# Patient Record
Sex: FEMALE | Race: WHITE | NOT HISPANIC OR LATINO | Employment: FULL TIME | URBAN - METROPOLITAN AREA
[De-identification: names, ages, dates, MRNs, and addresses within clinical notes are randomized per-mention and may not be internally consistent; named-entity substitution may affect disease eponyms.]

---

## 2018-07-09 ENCOUNTER — OFFICE VISIT (OUTPATIENT)
Dept: MEDICAL GROUP | Facility: MEDICAL CENTER | Age: 23
End: 2018-07-09
Payer: COMMERCIAL

## 2018-07-09 VITALS
TEMPERATURE: 99.2 F | RESPIRATION RATE: 16 BRPM | SYSTOLIC BLOOD PRESSURE: 110 MMHG | HEART RATE: 82 BPM | OXYGEN SATURATION: 97 % | DIASTOLIC BLOOD PRESSURE: 66 MMHG | BODY MASS INDEX: 28.05 KG/M2 | WEIGHT: 152.4 LBS | HEIGHT: 62 IN

## 2018-07-09 DIAGNOSIS — Z13.6 SCREENING FOR CARDIOVASCULAR CONDITION: ICD-10-CM

## 2018-07-09 DIAGNOSIS — Z30.41 ENCOUNTER FOR SURVEILLANCE OF CONTRACEPTIVE PILLS: ICD-10-CM

## 2018-07-09 DIAGNOSIS — R63.5 WEIGHT GAIN: ICD-10-CM

## 2018-07-09 DIAGNOSIS — F41.0 PANIC ATTACKS: ICD-10-CM

## 2018-07-09 DIAGNOSIS — Z00.00 WELLNESS EXAMINATION: ICD-10-CM

## 2018-07-09 DIAGNOSIS — F41.0 PANIC ATTACK: ICD-10-CM

## 2018-07-09 PROBLEM — Z83.49 FAMILY HISTORY OF THYROID DISORDER: Status: RESOLVED | Noted: 2018-07-09 | Resolved: 2018-07-09

## 2018-07-09 PROBLEM — F41.9 ANXIETY: Status: ACTIVE | Noted: 2018-07-09

## 2018-07-09 PROBLEM — Z83.49 FAMILY HISTORY OF THYROID DISORDER: Status: ACTIVE | Noted: 2018-07-09

## 2018-07-09 PROCEDURE — 99204 OFFICE O/P NEW MOD 45 MIN: CPT | Performed by: PHYSICIAN ASSISTANT

## 2018-07-09 RX ORDER — LEVONORGESTREL AND ETHINYL ESTRADIOL 0.1-0.02MG
KIT ORAL
COMMUNITY
Start: 2018-05-03 | End: 2018-08-03

## 2018-07-09 RX ORDER — ALPRAZOLAM 0.25 MG/1
TABLET ORAL
Qty: 20 TAB | Refills: 0 | Status: SHIPPED | OUTPATIENT
Start: 2018-07-09 | End: 2018-08-09

## 2018-07-09 ASSESSMENT — PATIENT HEALTH QUESTIONNAIRE - PHQ9: CLINICAL INTERPRETATION OF PHQ2 SCORE: 0

## 2018-07-09 NOTE — ASSESSMENT & PLAN NOTE
On BCPs. Not sure if they are contributing to panic attacks. Would like to consider IUD. Will schedule consult with Dr. Juarez. No hx of heavy bleeding or bad cramping. Definitely wants to be on something for birth control.

## 2018-07-09 NOTE — LETTER
[facility]  Carmella Duke P.A.-C.  [address]  [place] [address]  Fax: [phone]   Authorization for Release/Disclosure of   Protected Health Information   Name: BAUDILIO LANE : 1995 SSN: [id number]   Address: [address] [place] [address]  [place] [address] Phone:    [phone] (home)    I authorize the entity listed below to release/disclose the PHI below to:   [facility]/Carmella Duke P.A.-C. and Carmella Duke P.A.-C.   Provider or Entity Name:  [facility] KELLEE MERCER   Address   [place], [facility]   Phone:      Fax:     Reason for request: continuity of care   Information to be released:    [  ] LAST COLONOSCOPY,  including any PATH REPORT and follow-up  [  ] LAST FIT/COLOGUARD RESULT [  ] LAST DEXA  [  ] LAST MAMMOGRAM  [  ] LAST PAP  [  ] LAST LABS [  ] RETINA EXAM REPORT  [  ] IMMUNIZATION RECORDS  [  ] Release all info      [  ] Check here and initial the line next to each item to release ALL health information INCLUDING  _____ Care and treatment for drug and / or alcohol abuse  _____ HIV testing, infection status, or AIDS  _____ Genetic Testing    DATES OF SERVICE OR TIME PERIOD TO BE DISCLOSED: _____________  I understand and acknowledge that:  * This Authorization may be revoked at any time by you in writing, except if your health information has already been used or disclosed.  * Your health information that will be used or disclosed as a result of you signing this authorization could be re-disclosed by the recipient. If this occurs, your re-disclosed health information may no longer be protected by State or Federal laws.  * You may refuse to sign this Authorization. Your refusal will not affect your ability to obtain treatment.  * This Authorization becomes effective upon signing and will  on (date) __________.      If no date is indicated, this Authorization will  one (1) year from the signature date.    Name: Baudilio  Felicia    Signature:   Date:     7/9/2018       PLEASE FAX REQUESTED RECORDS BACK TO: [phone]

## 2018-07-09 NOTE — PROGRESS NOTES
Chief Complaint   Patient presents with   • Establish Care   • Anxiety   • Contraception     wondering if BC is related to birth control, started a year ago and anxiety has been worse since then   • Other     trouble losing weight, feet go numb sometimes       HISTORY OF THE PRESENT ILLNESS: This is a 23 y.o. female new patient to our practice. This pleasant patient is here today to establish care and discuss panic attacks, weight concerns, birth control pills    Panic attacks  Generally some mild anxiety, not bothersome for many years. Enjoys work, friends, in a healthy relationship, generally very happy. For 2 years getting more frequent panic attacks and getting more severe. 2-3 a week. Heart racing, stomach upset, diarrhea,  - full panic attacks. Getting harder to control. Previously hasn't been on any medication. Seeing psychologist through work. Very healthy diet. Exercises daily. Would like to discuss medication. No depression. No hx of drug, alcohol abuse.     Encounter for surveillance of contraceptive pills  On BCPs. Not sure if they are contributing to panic attacks. Would like to consider IUD. Will schedule consult with Dr. Juarez. No hx of heavy bleeding or bad cramping. Definitely wants to be on something for birth control.      History reviewed. No pertinent past medical history.no hx of heart or lung disease. No diabetes or immune disorder    History reviewed. No pertinent surgical history.    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 Very happy at her job. Sexually active with monogamous male partner. Non-smoker. No heavy alcohol use    Allergies: Cyclinex  "[tetracycline]    Current Outpatient Prescriptions Ordered in [facility]   Medication Sig Dispense Refill   • levonorgestrel-ethinyl estradiol (AVIANE, ALESSE, LESSINA) 0.1-20 MG-MCG per tablet      • ALPRAZolam (XANAX) 0.25 MG Tab Take 0.25mg at onset of panic attack, can repeat in 30 minutes if needed. Not for daily use 20 Tab 0     No current Epic-ordered facility-administered medications on file.        Review of Systems   Constitutional: Negative for fever, chills, weight loss - does chronically feel tired, thinks that is normal for her  HENT: Negative for ear pain, nosebleeds, congestion, sore throat and neck pain.    Eyes: Negative for blurred vision.   Respiratory: Negative for cough, sputum production, shortness of breath and wheezing.    Cardiovascular: Negative for chest pain, palpitations, orthopnea and leg swelling.   Gastrointestinal: Negative for heartburn, nausea, vomiting and abdominal pain.   Genitourinary: Negative for dysuria, urgency and frequency.   Musculoskeletal: Negative for myalgias, back pain and joint pain.   Skin: Negative for rash and itching.   Neurological: Negative for dizziness, tingling, tremors, sensory change, focal weakness and headaches.   Endo/Heme/Allergies: Does not bruise/bleed easily.      All other systems reviewed and are negative except as in HPI.    Exam: Blood pressure 110/66, pulse 82, temperature 37.3 °C (99.2 °F), resp. rate 16, height 1.575 m (5' 2\"), weight 69.1 kg (152 lb 6.4 oz), last menstrual period 07/04/2018, SpO2 97 %.  General: Normal appearing. No distress.  HEENT: Normocephalic. Eyes conjunctiva clear lids without ptosis, pupils equal and reactive to light accommodation, ears normal shape and contour, canals are clear bilaterally, tympanic membranes are benign, nasal mucosa benign, oropharynx is without erythema, edema or exudates.   Neck: Supple without JVD or bruit. Thyroid is not enlarged.  Pulmonary: Clear to ausculation.  Normal effort. No rales, " ronchi, or wheezing.  Cardiovascular: Regular rate and rhythm without murmur. Carotid and radial pulses are intact and equal bilaterally.  Neurologic: Grossly nonfocal  Lymph: No cervical, supraclavicular lymph nodes are palpable  Skin: Warm and dry.  No obvious lesions.  Musculoskeletal: Normal gait. No extremity cyanosis, clubbing, or edema.  Psych: Normal mood and affect. Alert and oriented x3. Judgment and insight is normal.    Assessment/Plan  1. Panic attacks     2. Weight gain     3. Wellness examination  CBC WITH DIFFERENTIAL    COMP METABOLIC PANEL    TSH   4. Screening for cardiovascular condition  LIPID PROFILE   5. Encounter for surveillance of contraceptive pills     6. Panic attack  ALPRAZolam (XANAX) 0.25 MG Tab     Check labs. Cont with psychologist. Discussed risk/benefit and use of xanax as well as side effects, possible addiction/dependency.  verified. Patient will not take with alcohol. Will not drive while on medication. Will f/u for lab review and to discuss symptoms. Will call with any concerns.

## 2018-07-09 NOTE — ASSESSMENT & PLAN NOTE
Generally some mild anxiety, not bothersome for many years. Enjoys work, friends, in a healthy relationship, generally very happy. For 2 years getting more frequent panic attacks and getting more severe. 2-3 a week. Heart racing, stomach upset, diarrhea,  - full panic attacks. Getting harder to control. Previously hasn't been on any medication. Seeing psychologist through work. Very healthy diet. Exercises daily. Would like to discuss medication. No depression. No hx of drug, alcohol abuse.

## 2018-07-09 NOTE — PATIENT INSTRUCTIONS
Panic Attacks  Panic attacks are sudden, short-lived surges of severe anxiety, fear, or discomfort. They may occur for no reason when you are relaxed, when you are anxious, or when you are sleeping. Panic attacks may occur for a number of reasons:  · Healthy people occasionally have panic attacks in extreme, life-threatening situations, such as war or natural disasters. Normal anxiety is a protective mechanism of the body that helps us react to danger (fight or flight response).  · Panic attacks are often seen with anxiety disorders, such as panic disorder, social anxiety disorder, generalized anxiety disorder, and phobias. Anxiety disorders cause excessive or uncontrollable anxiety. They may interfere with your relationships or other life activities.  · Panic attacks are sometimes seen with other mental illnesses, such as depression and posttraumatic stress disorder.  · Certain medical conditions, prescription medicines, and drugs of abuse can cause panic attacks.  What are the signs or symptoms?  Panic attacks start suddenly, peak within 20 minutes, and are accompanied by four or more of the following symptoms:  · Pounding heart or fast heart rate (palpitations).  · Sweating.  · Trembling or shaking.  · Shortness of breath or feeling smothered.  · Feeling choked.  · Chest pain or discomfort.  · Nausea or strange feeling in your stomach.  · Dizziness, light-headedness, or feeling like you will faint.  · Chills or hot flushes.  · Numbness or tingling in your lips or hands and feet.  · Feeling that things are not real or feeling that you are not yourself.  · Fear of losing control or going crazy.  · Fear of dying.  Some of these symptoms can mimic serious medical conditions. For example, you may think you are having a heart attack. Although panic attacks can be very scary, they are not life threatening.  How is this diagnosed?  Panic attacks are diagnosed through an assessment by your health care provider. Your  health care provider will ask questions about your symptoms, such as where and when they occurred. Your health care provider will also ask about your medical history and use of alcohol and drugs, including prescription medicines. Your health care provider may order blood tests or other studies to rule out a serious medical condition. Your health care provider may refer you to a mental health professional for further evaluation.  How is this treated?  · Most healthy people who have one or two panic attacks in an extreme, life-threatening situation will not require treatment.  · The treatment for panic attacks associated with anxiety disorders or other mental illness typically involves counseling with a mental health professional, medicine, or a combination of both. Your health care provider will help determine what treatment is best for you.  · Panic attacks due to physical illness usually go away with treatment of the illness. If prescription medicine is causing panic attacks, talk with your health care provider about stopping the medicine, decreasing the dose, or substituting another medicine.  · Panic attacks due to alcohol or drug abuse go away with abstinence. Some adults need professional help in order to stop drinking or using drugs.  Follow these instructions at home:  · Take all medicines as directed by your health care provider.  · Schedule and attend follow-up visits as directed by your health care provider. It is important to keep all your appointments.  Contact a health care provider if:  · You are not able to take your medicines as prescribed.  · Your symptoms do not improve or get worse.  Get help right away if:  · You experience panic attack symptoms that are different than your usual symptoms.  · You have serious thoughts about hurting yourself or others.  · You are taking medicine for panic attacks and have a serious side effect.  This information is not intended to replace advice given to you by  your health care provider. Make sure you discuss any questions you have with your health care provider.  Document Released: 12/18/2006 Document Revised: 05/25/2017 Document Reviewed: 08/01/2014  Elsevier Interactive Patient Education © 2017 [employer] Inc[employer]

## 2018-07-09 NOTE — ASSESSMENT & PLAN NOTE
Up to 160 a year ago. Has been able to lose 10 pounds with strict diet and exercise. 5386-5041 calories per day. 60 minutes cardio. 30 minutes strength training 5 days a week. Can't lose any more. May be genetic. Mom is heavier as well. Patient wants to make sure it isn't a thyroid issue.

## 2018-07-21 ENCOUNTER — HOSPITAL ENCOUNTER (OUTPATIENT)
Dept: LAB | Facility: MEDICAL CENTER | Age: 23
End: 2018-07-21
Attending: PHYSICIAN ASSISTANT
Payer: COMMERCIAL

## 2018-07-21 DIAGNOSIS — Z13.6 SCREENING FOR CARDIOVASCULAR CONDITION: ICD-10-CM

## 2018-07-21 DIAGNOSIS — Z00.00 WELLNESS EXAMINATION: ICD-10-CM

## 2018-07-21 LAB
ALBUMIN SERPL BCP-MCNC: 4.8 G/DL (ref 3.2–4.9)
ALBUMIN/GLOB SERPL: 1.8 G/DL
ALP SERPL-CCNC: 46 U/L (ref 30–99)
ALT SERPL-CCNC: 35 U/L (ref 2–50)
ANION GAP SERPL CALC-SCNC: 8 MMOL/L (ref 0–11.9)
AST SERPL-CCNC: 19 U/L (ref 12–45)
BASOPHILS # BLD AUTO: 1.1 % (ref 0–1.8)
BASOPHILS # BLD: 0.06 K/UL (ref 0–0.12)
BILIRUB SERPL-MCNC: 0.4 MG/DL (ref 0.1–1.5)
BUN SERPL-MCNC: 18 MG/DL (ref 8–22)
CALCIUM SERPL-MCNC: 10 MG/DL (ref 8.5–10.5)
CHLORIDE SERPL-SCNC: 107 MMOL/L (ref 96–112)
CHOLEST SERPL-MCNC: 140 MG/DL (ref 100–199)
CO2 SERPL-SCNC: 26 MMOL/L (ref 20–33)
CREAT SERPL-MCNC: 0.75 MG/DL (ref 0.5–1.4)
EOSINOPHIL # BLD AUTO: 0.05 K/UL (ref 0–0.51)
EOSINOPHIL NFR BLD: 0.9 % (ref 0–6.9)
ERYTHROCYTE [DISTWIDTH] IN BLOOD BY AUTOMATED COUNT: 40.9 FL (ref 35.9–50)
GLOBULIN SER CALC-MCNC: 2.7 G/DL (ref 1.9–3.5)
GLUCOSE SERPL-MCNC: 88 MG/DL (ref 65–99)
HCT VFR BLD AUTO: 42.2 % (ref 37–47)
HDLC SERPL-MCNC: 55 MG/DL
HGB BLD-MCNC: 13.3 G/DL (ref 12–16)
IMM GRANULOCYTES # BLD AUTO: 0.01 K/UL (ref 0–0.11)
IMM GRANULOCYTES NFR BLD AUTO: 0.2 % (ref 0–0.9)
LDLC SERPL CALC-MCNC: 76 MG/DL
LYMPHOCYTES # BLD AUTO: 1.64 K/UL (ref 1–4.8)
LYMPHOCYTES NFR BLD: 30.4 % (ref 22–41)
MCH RBC QN AUTO: 27 PG (ref 27–33)
MCHC RBC AUTO-ENTMCNC: 31.5 G/DL (ref 33.6–35)
MCV RBC AUTO: 85.8 FL (ref 81.4–97.8)
MONOCYTES # BLD AUTO: 0.34 K/UL (ref 0–0.85)
MONOCYTES NFR BLD AUTO: 6.3 % (ref 0–13.4)
NEUTROPHILS # BLD AUTO: 3.3 K/UL (ref 2–7.15)
NEUTROPHILS NFR BLD: 61.1 % (ref 44–72)
NRBC # BLD AUTO: 0 K/UL
NRBC BLD-RTO: 0 /100 WBC
PLATELET # BLD AUTO: 286 K/UL (ref 164–446)
PMV BLD AUTO: 11.7 FL (ref 9–12.9)
POTASSIUM SERPL-SCNC: 4.1 MMOL/L (ref 3.6–5.5)
PROT SERPL-MCNC: 7.5 G/DL (ref 6–8.2)
RBC # BLD AUTO: 4.92 M/UL (ref 4.2–5.4)
SODIUM SERPL-SCNC: 141 MMOL/L (ref 135–145)
TRIGL SERPL-MCNC: 44 MG/DL (ref 0–149)
TSH SERPL DL<=0.005 MIU/L-ACNC: 1.18 UIU/ML (ref 0.38–5.33)
WBC # BLD AUTO: 5.4 K/UL (ref 4.8–10.8)

## 2018-07-21 PROCEDURE — 80061 LIPID PANEL: CPT

## 2018-07-21 PROCEDURE — 36415 COLL VENOUS BLD VENIPUNCTURE: CPT

## 2018-07-21 PROCEDURE — 84443 ASSAY THYROID STIM HORMONE: CPT

## 2018-07-21 PROCEDURE — 80053 COMPREHEN METABOLIC PANEL: CPT

## 2018-07-21 PROCEDURE — 85025 COMPLETE CBC W/AUTO DIFF WBC: CPT

## 2018-07-23 ENCOUNTER — OFFICE VISIT (OUTPATIENT)
Dept: MEDICAL GROUP | Facility: MEDICAL CENTER | Age: 23
End: 2018-07-23
Payer: COMMERCIAL

## 2018-07-23 VITALS
SYSTOLIC BLOOD PRESSURE: 100 MMHG | WEIGHT: 147.6 LBS | BODY MASS INDEX: 27.16 KG/M2 | RESPIRATION RATE: 16 BRPM | OXYGEN SATURATION: 98 % | HEIGHT: 62 IN | DIASTOLIC BLOOD PRESSURE: 60 MMHG | HEART RATE: 84 BPM

## 2018-07-23 DIAGNOSIS — F41.0 PANIC ATTACKS: ICD-10-CM

## 2018-07-23 PROCEDURE — 99213 OFFICE O/P EST LOW 20 MIN: CPT | Performed by: PHYSICIAN ASSISTANT

## 2018-07-23 NOTE — ASSESSMENT & PLAN NOTE
Patient states she is doing well. Going through some changes at work, may be getting promotion, very busy. Just broke up with boyfriend, together 6 years, feels good about it generally but still stressful. Only took the xanax once and felt it worked well. Feels better just knowing she has something to take if she needs it. Not interested in further evaluation and/or treatment at this time but will f/u as needed.

## 2018-07-23 NOTE — PROGRESS NOTES
"Subjective:   Charlotte Duarte is a 23 y.o. female here today for panic attack f/u    Panic attacks  Patient states she is doing well. Going through some changes at work, may be getting promotion, very busy. Just broke up with boyfriend, together 6 years, feels good about it generally but still stressful. Only took the xanax once and felt it worked well. Feels better just knowing she has something to take if she needs it. Not interested in further evaluation and/or treatment at this time but will f/u as needed.       Current medicines (including changes today)  Current Outpatient Prescriptions   Medication Sig Dispense Refill   • levonorgestrel-ethinyl estradiol (AVIANE, ALESSE, LESSINA) 0.1-20 MG-MCG per tablet      • ALPRAZolam (XANAX) 0.25 MG Tab Take 0.25mg at onset of panic attack, can repeat in 30 minutes if needed. Not for daily use 20 Tab 0     No current facility-administered medications for this visit.      She  has no past medical history on file.    ROS   No fever/chills. No headache/dizziness. No focal weakness. No sore throat, nasal congestion, ear pain. No chest pain, no shortness of breath, difficulty breathing. No n/v/d/c or abdominal pain. No urinary complaint. No rash or skin lesion. No joint pain or swelling.       Objective:     Blood pressure 100/60, pulse 84, resp. rate 16, height 1.575 m (5' 2\"), weight 67 kg (147 lb 9.6 oz), last menstrual period 07/04/2018, SpO2 98 %. Body mass index is 27 kg/m².   Physical Exam:  Constitutional: WDWN, NAD  Skin: Warm, dry, good turgor, no rashes in visible areas.  Psych: Alert and oriented x3, normal affect and mood.        Assessment and Plan:   The following treatment plan was discussed    1. Panic attacks  Xanax prn. Cont healthy diet and exercise. f/u as needed    Recent CBC, CMP, TSH, Lipid panel reviewed with patient in office, all wnl      Followup: Return if symptoms worsen or fail to improve.         "

## 2018-08-03 ENCOUNTER — OFFICE VISIT (OUTPATIENT)
Dept: MEDICAL GROUP | Facility: MEDICAL CENTER | Age: 23
End: 2018-08-03
Payer: COMMERCIAL

## 2018-08-03 VITALS
WEIGHT: 147 LBS | SYSTOLIC BLOOD PRESSURE: 112 MMHG | RESPIRATION RATE: 16 BRPM | HEIGHT: 62 IN | HEART RATE: 73 BPM | DIASTOLIC BLOOD PRESSURE: 60 MMHG | OXYGEN SATURATION: 96 % | BODY MASS INDEX: 27.05 KG/M2 | TEMPERATURE: 98.5 F

## 2018-08-03 DIAGNOSIS — L70.9 ACNE, UNSPECIFIED ACNE TYPE: ICD-10-CM

## 2018-08-03 DIAGNOSIS — Z30.41 ENCOUNTER FOR SURVEILLANCE OF CONTRACEPTIVE PILLS: ICD-10-CM

## 2018-08-03 DIAGNOSIS — Z30.019 ENCOUNTER FOR INITIAL PRESCRIPTION OF CONTRACEPTIVES, UNSPECIFIED CONTRACEPTIVE: ICD-10-CM

## 2018-08-03 DIAGNOSIS — F41.0 PANIC ATTACKS: ICD-10-CM

## 2018-08-03 PROCEDURE — 99202 OFFICE O/P NEW SF 15 MIN: CPT | Performed by: FAMILY MEDICINE

## 2018-08-03 RX ORDER — SPIRONOLACTONE 25 MG/1
25 TABLET ORAL 2 TIMES DAILY
Qty: 180 TAB | Refills: 3 | Status: SHIPPED | OUTPATIENT
Start: 2018-08-03 | End: 2018-11-01

## 2018-08-04 NOTE — PROGRESS NOTES
"This medical record contains text that has been entered with the assistance of computer voice recognition and dictation software.  Therefore, it may contain unintended errors in text, spelling, punctuation, or grammar        Chief Complaint   Patient presents with   • Contraception     IUD       Charlotte Duarte is a 23 y.o. female here evaluation and management of: discuss IUD contraception       HPI:     Pt was in [employer]   She works for [employer]   She has had bad experience with OCP wt gain mood swings  Went off pills  Worsening acne  Desires [facility]         Current Outpatient Prescriptions   Medication Sig Dispense Refill   • levonorgestrel (MIRENA) 20 MCG/24HR IUD 1 Intra Uterine Device by Intrauterine route Once for 1 dose. 1 Intra Uterine Device 0   • spironolactone (ALDACTONE) 25 MG Tab Take 1 Tab by mouth 2 times a day for 90 days. 180 Tab 3   • ALPRAZolam (XANAX) 0.25 MG Tab Take 0.25mg at onset of panic attack, can repeat in 30 minutes if needed. Not for daily use 20 Tab 0     No current facility-administered medications for this visit.      Patient Active Problem List    Diagnosis Date Noted   • Encounter for initial prescription of contraceptives 08/03/2018   • Acne 08/03/2018   • Panic attacks 07/09/2018   • Weight gain 07/09/2018   • Encounter for surveillance of contraceptive pills 07/09/2018     No past surgical history on file.   Social History   Substance Use Topics   • Smoking status: Never Smoker   • Smokeless tobacco: Never Used   • Alcohol use Yes     Family History   Problem Relation Age of Onset   • No Known Problems Mother    • Hypertension Father    • Arthritis Sister            ROS  No n/v  all review of system completed and negative except for those listed above     Objective:     Blood pressure 112/60, pulse 73, temperature 36.9 °C (98.5 °F), resp. rate 16, height 1.575 m (5' 2\"), weight 66.7 kg (147 lb), last menstrual period 07/28/2018, SpO2 96 %. Body mass index is 26.89 " kg/m².  Physical Exam:        GEN: comfortable, alert and oriented, well nourished, well developed, in no apparent distress   HEENT: NCAT, eyes: pupils equal and reactive, sclera white, EOMIT, good dentition  HEART: limbs warm and well perfused, regular rate, no JVD, no lower extremity edema  LUNGS: speaking in full sentences, not in apparent respiratory distress, no audible wheezes  MSK: normal tone and bulk, no swelling of the joints, gait steady and normal           Assessment and Plan:   The following treatment plan was discussed        Problem List Items Addressed This Visit     Panic attacks     Would like to avoid or minimize hormone exogenous   Feels that makes anxiety worse             Encounter for surveillance of contraceptive pills     Stopped already            Encounter for initial prescription of contraceptives     After discussion we decide to go with IUD             Relevant Medications    levonorgestrel (MIRENA) 20 MCG/24HR IUD    Acne     Recently stopped ocp  spironolactone for hormonal acne               Relevant Medications    spironolactone (ALDACTONE) 25 MG Tab                Instructed to follow up if symptoms worsen or fail to improve, ER/UC precautions discussed as well    Trupti Juarez MD  [facility], Family Medicine   [address] [place], [place] [address]y   [place] [address]  Phone: [phone]

## 2018-08-29 ENCOUNTER — OFFICE VISIT (OUTPATIENT)
Dept: MEDICAL GROUP | Facility: MEDICAL CENTER | Age: 23
End: 2018-08-29
Payer: COMMERCIAL

## 2018-08-29 VITALS
DIASTOLIC BLOOD PRESSURE: 60 MMHG | RESPIRATION RATE: 18 BRPM | SYSTOLIC BLOOD PRESSURE: 112 MMHG | TEMPERATURE: 98.5 F | WEIGHT: 147 LBS | OXYGEN SATURATION: 95 % | HEIGHT: 62 IN | BODY MASS INDEX: 27.05 KG/M2 | HEART RATE: 79 BPM

## 2018-08-29 DIAGNOSIS — Z30.430 ENCOUNTER FOR IUD INSERTION: ICD-10-CM

## 2018-08-29 LAB
INT CON NEG: NEGATIVE
INT CON POS: POSITIVE
POC URINE PREGNANCY TEST: NEGATIVE

## 2018-08-29 PROCEDURE — 81025 URINE PREGNANCY TEST: CPT | Performed by: FAMILY MEDICINE

## 2018-08-29 PROCEDURE — 58300 INSERT INTRAUTERINE DEVICE: CPT | Performed by: FAMILY MEDICINE

## 2018-08-29 RX ORDER — ALPRAZOLAM 0.25 MG/1
0.25 TABLET ORAL NIGHTLY PRN
COMMUNITY
End: 2019-06-26 | Stop reason: SDUPTHER

## 2018-08-29 NOTE — PROGRESS NOTES
IUD Insertion Note:  The patient presents for mirena IUD insertion.  The pap smear history has been reviewed.  She has not had any recent unprotected sex. She has no contraindications to IUD and we discussed the risks, benefits, expected side effects and alternatives to the IUD.  She understands that the IUD does not protect against STI's.  All questions answered and written consent obtained     Procedure Note:  Bi-mannual pelvic examination: Normal external female genitalia.  No lesions. Uterus size shape and consistency wnl.  No adnexal masses.  No CMT.  Uterus position: retrovert  A speculum was placed into vagina and cervix  Normal appearing cervix, normal vaginal gricelda, no purulent discharge  The cervix was cleaned with betadine.   The physician used sterile procedure gloves.     A sterile tenaculum was placed. The uterus was measured using a sterile sound and was measured to be 7cm.  The sound was then withdrawn. The IUD was  loaded in a sterile manner and advanced into position. The string was visualized and cut to approximately 3.5cm.    Patient tolerated the procedure well. No complications.    She was given a detailed aftercare instructions and return/ER precautions as well as detailed instructions about her IUD.      1 mo follow up for IUD string check with MD benton

## 2019-04-25 ENCOUNTER — PATIENT MESSAGE (OUTPATIENT)
Dept: MEDICAL GROUP | Facility: MEDICAL CENTER | Age: 24
End: 2019-04-25

## 2019-05-02 ENCOUNTER — OFFICE VISIT (OUTPATIENT)
Dept: MEDICAL GROUP | Facility: MEDICAL CENTER | Age: 24
End: 2019-05-02
Payer: COMMERCIAL

## 2019-05-02 VITALS
WEIGHT: 149.03 LBS | RESPIRATION RATE: 16 BRPM | BODY MASS INDEX: 27.43 KG/M2 | HEIGHT: 62 IN | DIASTOLIC BLOOD PRESSURE: 60 MMHG | HEART RATE: 80 BPM | SYSTOLIC BLOOD PRESSURE: 112 MMHG | TEMPERATURE: 99 F | OXYGEN SATURATION: 99 %

## 2019-05-02 DIAGNOSIS — Z30.431 IUD CHECK UP: ICD-10-CM

## 2019-05-02 PROCEDURE — 99212 OFFICE O/P EST SF 10 MIN: CPT | Performed by: FAMILY MEDICINE

## 2019-05-02 ASSESSMENT — PAIN SCALES - GENERAL: PAINLEVEL: NO PAIN

## 2019-05-02 ASSESSMENT — PATIENT HEALTH QUESTIONNAIRE - PHQ9: CLINICAL INTERPRETATION OF PHQ2 SCORE: 0

## 2019-05-02 NOTE — ASSESSMENT & PLAN NOTE
IUD strings visualized with closed os indicating that IUD is in place  Strings trimmed     All questions answered    Over 25 minutes spent with patient face to face, greater than 50% time spent with plan/coordination of care regarding that which is discussed in the HPI and A&P

## 2019-05-02 NOTE — PROGRESS NOTES
"This medical record contains text that has been entered with the assistance of computer voice recognition and dictation software.  Therefore, it may contain unintended errors in text, spelling, punctuation, or grammar        Chief Complaint   Patient presents with   • Contraception     misplaced IUD after sexual intercourse        Charlotte Duarte is a 24 y.o. female here evaluation and management of: discuss IUD contraception       HPI:     Pt was in [employer]   She works for [employer]   We placed IUD this summer  Having some occasional \"poking\"  Amenorrhea   No pelvic pain bloating     Current Outpatient Prescriptions   Medication Sig Dispense Refill   • ALPRAZolam (XANAX) 0.25 MG Tab Take 0.25 mg by mouth at bedtime as needed for Sleep.       No current facility-administered medications for this visit.      Patient Active Problem List    Diagnosis Date Noted   • IUD check up 05/02/2019   • Encounter for IUD insertion 08/29/2018   • Encounter for initial prescription of contraceptives 08/03/2018   • Acne 08/03/2018   • Panic attacks 07/09/2018   • Weight gain 07/09/2018   • Encounter for surveillance of contraceptive pills 07/09/2018     No past surgical history on file.   Social History   Substance Use Topics   • Smoking status: Never Smoker   • Smokeless tobacco: Never Used   • Alcohol use Yes     Family History   Problem Relation Age of Onset   • No Known Problems Mother    • Hypertension Father    • Arthritis Sister            ROS  No n/v  all review of system completed and negative except for those listed above     Objective:     /60 (BP Location: Right arm, Patient Position: Sitting, BP Cuff Size: Adult)   Pulse 80   Temp 37.2 °C (99 °F) (Temporal)   Resp 16   Ht 1.575 m (5' 2\")   Wt 67.6 kg (149 lb 0.5 oz)   SpO2 99%  Body mass index is 27.26 kg/m².  Physical Exam:        GEN: comfortable, alert and oriented, well nourished, well developed, in no apparent distress   HEENT: NCAT, eyes: pupils " equal and reactive, sclera white, EOMIT, good dentition  HEART: limbs warm and well perfused, regular rate, no JVD, no lower extremity edema  LUNGS: speaking in full sentences, not in apparent respiratory distress, no audible wheezes  MSK: normal tone and bulk, no swelling of the joints, gait steady and normal      -   Normal external female genitalia   PELVIC:  Normal external female genitalia.  Speculum: Vaginal gricelda wnl no purulent discharge.  Cervix non friable.  Bimanual examination : Uterus size shape and consistency wnl.  No adnexal masses.  No Cervical motion tenderness   IUD strings visualized             Assessment and Plan:   The following treatment plan was discussed        Problem List Items Addressed This Visit     IUD check up     IUD strings visualized with closed os indicating that IUD is in place  Strings trimmed     All questions answered                           Instructed to follow up if symptoms worsen or fail to improve, ER/UC precautions discussed as well    Trupti Juarez MD  [facility], Family Medicine   [address] [place] [address] Pky   [place] [address]  Phone: [phone]

## 2019-06-26 ENCOUNTER — OFFICE VISIT (OUTPATIENT)
Dept: MEDICAL GROUP | Facility: MEDICAL CENTER | Age: 24
End: 2019-06-26
Payer: COMMERCIAL

## 2019-06-26 VITALS
SYSTOLIC BLOOD PRESSURE: 102 MMHG | HEIGHT: 62 IN | BODY MASS INDEX: 27.05 KG/M2 | OXYGEN SATURATION: 98 % | RESPIRATION RATE: 16 BRPM | WEIGHT: 147 LBS | TEMPERATURE: 98.2 F | HEART RATE: 84 BPM | DIASTOLIC BLOOD PRESSURE: 64 MMHG

## 2019-06-26 DIAGNOSIS — F41.0 PANIC ATTACKS: ICD-10-CM

## 2019-06-26 DIAGNOSIS — F41.9 ANXIETY: ICD-10-CM

## 2019-06-26 PROCEDURE — 99213 OFFICE O/P EST LOW 20 MIN: CPT | Performed by: PHYSICIAN ASSISTANT

## 2019-06-26 RX ORDER — ALPRAZOLAM 0.25 MG/1
0.25 TABLET ORAL NIGHTLY PRN
Qty: 30 TAB | Refills: 0 | Status: SHIPPED
Start: 2019-06-26 | End: 2019-07-26

## 2019-06-26 RX ORDER — BUSPIRONE HYDROCHLORIDE 7.5 MG/1
7.5 TABLET ORAL 3 TIMES DAILY PRN
Qty: 90 TAB | Refills: 1 | Status: SHIPPED | OUTPATIENT
Start: 2019-06-26 | End: 2019-07-26

## 2019-06-28 NOTE — ASSESSMENT & PLAN NOTE
Symptoms have worsened recently. Unclear of cause but feeling more and more daily anxiety. It is difficult for her to go out with friends or go on trips because it makes her anxious. She denies feelings of depression or history of depression. She is seeing a therapist at her job but would like to think about daily medication. Anxious feelings include nervousness, nausea and/or stomach upset, palpitations, sometimes difficulty getting a deep breath. She is going on a trip with friends soon and doesn't want the anxiety and the panic attacks to get in the way of her time with friends.

## 2019-06-28 NOTE — PROGRESS NOTES
"Subjective:   Charlotte Duarte is a 24 y.o. female here today for anxiety and panic attacks    Panic attacks  Symptoms have worsened recently. Unclear of cause but feeling more and more daily anxiety. It is difficult for her to go out with friends or go on trips because it makes her anxious. She denies feelings of depression or history of depression. She is seeing a therapist at her job but would like to think about daily medication. Anxious feelings include nervousness, nausea and/or stomach upset, palpitations, sometimes difficulty getting a deep breath. She is going on a trip with friends soon and doesn't want the anxiety and the panic attacks to get in the way of her time with friends.        Current medicines (including changes today)  Current Outpatient Prescriptions   Medication Sig Dispense Refill   • ALPRAZolam (XANAX) 0.25 MG Tab Take 1 Tab by mouth at bedtime as needed for Sleep for up to 30 days. 30 Tab 0   • busPIRone (BUSPAR) 7.5 MG tablet Take 1 Tab by mouth 3 times a day as needed for up to 30 days. 90 Tab 1     No current facility-administered medications for this visit.      She  has no past medical history on file.    ROS   No fever/chills. No weight change. No headache/dizziness. No focal weakness. No sore throat, nasal congestion, ear pain. No chest pain, no shortness of breath, difficulty breathing. No n/v/d/c or abdominal pain. No urinary complaint. No rash or skin lesion. No joint pain or swelling.       Objective:     /64 (BP Location: Right arm, Patient Position: Sitting)   Pulse 84   Temp 36.8 °C (98.2 °F) (Temporal)   Resp 16   Ht 1.575 m (5' 2\")   Wt 66.7 kg (147 lb)   SpO2 98%  Body mass index is 26.89 kg/m².   Physical Exam:  Constitutional: WDWN, NAD  Skin: Warm, dry, good turgor, no rashes in visible areas.  Psych: Alert and oriented x3, normal affect and mood.    Assessment and Plan:   The following treatment plan was discussed    1. Anxiety  Discussed risk/benefit and " potential side effects of medication. Start with one daily then can increase to up to 3 times daily if needed and if tolerated.   - busPIRone (BUSPAR) 7.5 MG tablet; Take 1 Tab by mouth 3 times a day as needed for up to 30 days.  Dispense: 90 Tab; Refill: 1    2. Panic attacks  [facility] Aware web site evaluation: I have obtained and reviewed patient utilization report from [facility] pharmacy database prior to writing prescription for controlled substance II, III or IV. Based on the report and my clinical assessment the prescription is medically necessary.   Patient is cautioned on sedation potential of benzodiazepine medication; no drinking, driving or operating heavy machinery while on this medication.  - ALPRAZolam (XANAX) 0.25 MG Tab; Take 1 Tab by mouth at bedtime as needed for Sleep for up to 30 days.  Dispense: 30 Tab; Refill: 0    Controlled Substance Assessment:     - Is the medication, if previously prescribed, working as intended and expected to treat   the patients symptoms: yes    - Does the patient have a history of substance abuse: no    - has the patient demonstrated aberrant behavior or intoxication: no.     - Is there reason to believe that the patient is not using medication as prescribed or diverting the medication: no    - Is there reason to believe that the patient is currently misusing this medication or addicted to the controlled substance: no    - Is it necessary to verify that controlled substances, other that those authorized under the treatment plan, are not present in the body of this patient: no    - Are there any blood or urine test that indicate inappropriate use of the medication or other controlled substances : no    - I have reviewed the . Does the  report irregular/inconsistent medication use or indicate that the medication is being used inappropriately or that the patient is using another controlled substance not prescribed or known to me: no    - Is there reason to  believe that the patient is using other drugs, including alcohol, prescription or illicit drugs, that may interact negatively with controlled substance or have not been prescribed by a practitioner who is treating the patient: no    - Are there any known early refill attempts or claims that medication has been lost or stolen: no    - Are there are any major changes in the patient's mental or physical health that would affect the medical appropriateness of this medication: no    - Has the patient increased the dose of medication without provider authorization:no    - Has the patient been reluctant to cooperate with any exam, analysis or test recommended by me: no    - Has the patient demonstrated reluctance to stop or reduce use of the medicine: no    - Has the patient requested or demanded a controlled substance that is likely to be abused or cause dependency or addiction: no    - Is there any other evidence that the patient is chronically using opioids, misusing, abusing, illegally using or addicted to any drug or failing to comply with the instructions of the practitioner concerning the use of the controlled substance:no    - Are there any other factors that the practitioner determines is necessary to make an informed professional judgment concerning the medical appropriateness of the prescription: no  Followup: one month

## 2019-07-12 ENCOUNTER — PATIENT MESSAGE (OUTPATIENT)
Dept: MEDICAL GROUP | Facility: MEDICAL CENTER | Age: 24
End: 2019-07-12

## 2019-07-12 DIAGNOSIS — F41.9 ANXIETY: ICD-10-CM

## 2019-07-12 RX ORDER — BUSPIRONE HYDROCHLORIDE 15 MG/1
15 TABLET ORAL 3 TIMES DAILY
Qty: 90 TAB | Refills: 2 | Status: SHIPPED | OUTPATIENT
Start: 2019-07-12 | End: 2019-08-06 | Stop reason: SDUPTHER

## 2019-07-26 ENCOUNTER — OFFICE VISIT (OUTPATIENT)
Dept: MEDICAL GROUP | Facility: MEDICAL CENTER | Age: 24
End: 2019-07-26
Payer: COMMERCIAL

## 2019-07-26 VITALS
SYSTOLIC BLOOD PRESSURE: 94 MMHG | HEIGHT: 62 IN | WEIGHT: 149.47 LBS | RESPIRATION RATE: 16 BRPM | OXYGEN SATURATION: 98 % | TEMPERATURE: 97.9 F | BODY MASS INDEX: 27.51 KG/M2 | DIASTOLIC BLOOD PRESSURE: 62 MMHG | HEART RATE: 72 BPM

## 2019-07-26 DIAGNOSIS — L98.9 SKIN LESION OF FACE: ICD-10-CM

## 2019-07-26 DIAGNOSIS — F41.0 PANIC ATTACKS: ICD-10-CM

## 2019-07-26 PROBLEM — Z30.019 ENCOUNTER FOR INITIAL PRESCRIPTION OF CONTRACEPTIVES: Status: RESOLVED | Noted: 2018-08-03 | Resolved: 2019-07-26

## 2019-07-26 PROBLEM — Z30.41 ENCOUNTER FOR SURVEILLANCE OF CONTRACEPTIVE PILLS: Status: RESOLVED | Noted: 2018-07-09 | Resolved: 2019-07-26

## 2019-07-26 PROBLEM — Z30.431 IUD CHECK UP: Status: RESOLVED | Noted: 2019-05-02 | Resolved: 2019-07-26

## 2019-07-26 PROBLEM — Z30.430 ENCOUNTER FOR IUD INSERTION: Status: RESOLVED | Noted: 2018-08-29 | Resolved: 2019-07-26

## 2019-07-26 PROCEDURE — 99213 OFFICE O/P EST LOW 20 MIN: CPT | Performed by: PHYSICIAN ASSISTANT

## 2019-07-26 NOTE — ASSESSMENT & PLAN NOTE
Anxiety is improving on buspirone 15mg TID. Would like to continue current. Denies any depression symptoms. No adverse reaction to medication other than mild dizziness/nausea after she takes the 2pm dose. Discussed decreasing to twice daily if tolerated. Seeing therapist at work. Planning to restart exercise.

## 2019-07-26 NOTE — PROGRESS NOTES
"Subjective:   Charlotte Duarte is a 24 y.o. female here today for follow up on panic attacks, bupsirone    Panic attacks  Anxiety is improving on buspirone 15mg TID. Would like to continue current. Denies any depression symptoms. No adverse reaction to medication other than mild dizziness/nausea after she takes the 2pm dose. Discussed decreasing to twice daily if tolerated. Seeing therapist at work. Planning to restart exercise.     Current medicines (including changes today)  Current Outpatient Prescriptions   Medication Sig Dispense Refill   • busPIRone (BUSPAR) 15 MG tablet Take 1 Tab by mouth 3 times a day. 90 Tab 2   • ALPRAZolam (XANAX) 0.25 MG Tab Take 1 Tab by mouth at bedtime as needed for Sleep for up to 30 days. 30 Tab 0     No current facility-administered medications for this visit.      She  has no past medical history on file.    ROS   No fever/chills. No weight change. No headache/dizziness. No focal weakness. No sore throat, nasal congestion, ear pain. No chest pain, no shortness of breath, difficulty breathing. No n/v/d/c or abdominal pain. No urinary complaint. No rash. No joint pain or swelling.     Objective:     BP (!) 94/62 (BP Location: Left arm, Patient Position: Sitting)   Pulse 72   Temp 36.6 °C (97.9 °F) (Temporal)   Resp 16   Ht 1.575 m (5' 2\")   Wt 67.8 kg (149 lb 7.6 oz)   SpO2 98%  Body mass index is 27.34 kg/m².   Physical Exam:  Constitutional: WDWN, NAD  Skin: Warm, dry, good turgor, no rashes in visible areas.  Psych: Alert and oriented x3, normal affect and mood.    Assessment and Plan:   The following treatment plan was discussed    1. Panic attacks  Cont current    2. Skin lesion of face    - REFERRAL TO DERMATOLOGY      Followup: Return if symptoms worsen or fail to improve.         "

## 2019-08-06 DIAGNOSIS — F41.9 ANXIETY: ICD-10-CM

## 2019-08-07 ENCOUNTER — OFFICE VISIT (OUTPATIENT)
Dept: DERMATOLOGY | Facility: IMAGING CENTER | Age: 24
End: 2019-08-07
Payer: COMMERCIAL

## 2019-08-07 VITALS — WEIGHT: 145 LBS | BODY MASS INDEX: 26.68 KG/M2 | HEIGHT: 62 IN | TEMPERATURE: 99.8 F

## 2019-08-07 DIAGNOSIS — D22.39 FIBROUS PAPULE OF NOSE: ICD-10-CM

## 2019-08-07 DIAGNOSIS — L70.0 ACNE VULGARIS: ICD-10-CM

## 2019-08-07 DIAGNOSIS — B07.0 PLANTAR VERRUCA: ICD-10-CM

## 2019-08-07 PROCEDURE — 99203 OFFICE O/P NEW LOW 30 MIN: CPT | Performed by: DERMATOLOGY

## 2019-08-07 RX ORDER — BUSPIRONE HYDROCHLORIDE 15 MG/1
TABLET ORAL
Qty: 90 TAB | Refills: 6 | Status: SHIPPED | OUTPATIENT
Start: 2019-08-07 | End: 2020-02-27

## 2019-08-07 RX ORDER — CLINDAMYCIN PHOSPHATE AND BENZOYL PEROXIDE 10; 50 MG/G; MG/G
1 GEL TOPICAL DAILY
Qty: 1 TUBE | Refills: 3 | Status: SHIPPED | OUTPATIENT
Start: 2019-08-07 | End: 2021-12-15

## 2019-08-07 RX ORDER — TRETINOIN 0.5 MG/G
CREAM TOPICAL
Qty: 45 G | Refills: 3 | Status: SHIPPED | OUTPATIENT
Start: 2019-08-07 | End: 2021-12-15

## 2019-08-07 RX ORDER — ALPRAZOLAM 0.25 MG/1
0.25 TABLET ORAL PRN
COMMUNITY
End: 2021-12-15 | Stop reason: SDUPTHER

## 2019-08-07 ASSESSMENT — ENCOUNTER SYMPTOMS
FEVER: 0
CHILLS: 0

## 2019-11-07 ENCOUNTER — OFFICE VISIT (OUTPATIENT)
Dept: DERMATOLOGY | Facility: IMAGING CENTER | Age: 24
End: 2019-11-07
Payer: COMMERCIAL

## 2019-11-07 DIAGNOSIS — L70.0 ACNE VULGARIS: ICD-10-CM

## 2019-11-07 DIAGNOSIS — D48.5 NEOPLASM OF UNCERTAIN BEHAVIOR OF SKIN: ICD-10-CM

## 2019-11-07 PROCEDURE — 11102 TANGNTL BX SKIN SINGLE LES: CPT | Performed by: DERMATOLOGY

## 2019-11-07 PROCEDURE — 99212 OFFICE O/P EST SF 10 MIN: CPT | Mod: 25 | Performed by: DERMATOLOGY

## 2019-11-07 ASSESSMENT — ENCOUNTER SYMPTOMS
CHILLS: 0
FEVER: 0

## 2019-11-07 NOTE — PROGRESS NOTES
Dermatology Return Patient Visit    Chief Complaint   Patient presents with   • Acne       Subjective:     HPI:   Charlotte Duarte is a 24 y.o. female presenting for    Follow up acne, pt is doing better  Using clindamycin/Benzoyl peroxide at night and retin a in the mornings  Wearing sunscreen    Acne  Started: 15 years  Active on: Face and back  Aggravated by: menses, makeup  Treatment currently used: BZP spot cream, witch hazel tone - has used spironolactone in the past, caused nausea  Face wash/moisturizer:Cetaphil face wash, BZP spot cream, clinique moisturizer,  Family history of scarring acne: no  Contraception: Mirena IUD, was previous on OCPs - acne was doing well at that point  Family h/o breast/uterine/ovarian cancers: Yes, Maternal GM BrCa    Spot on the left nasal sidewall  Present x years  Growing, itchy and gets traumatized      No past medical history on file.    Current Outpatient Medications on File Prior to Visit   Medication Sig Dispense Refill   • busPIRone (BUSPAR) 15 MG tablet TAKE 1 TABLET BY MOUTH THREE TIMES A DAY 90 Tab 6   • ALPRAZolam (XANAX) 0.25 MG Tab Take 0.25 mg by mouth as needed for Sleep.     • tretinoin (RETIN-A) 0.05 % cream Pea-sized amount to the entire face at night. Start q2-3nights a week, increase to nightly as tolerated 45 g 3   • Clindamycin-Benzoyl Per, Refr, 1.2-5 % Gel 1 Application by Apply externally route every day. 1 Tube 3     No current facility-administered medications on file prior to visit.        Allergies   Allergen Reactions   • Cyclinex [Tetracycline] Hives     ALL CYCLINES       Family History   Problem Relation Age of Onset   • No Known Problems Mother    • Hypertension Father    • Arthritis Sister        Social History     Socioeconomic History   • Marital status: Single     Spouse name: Not on file   • Number of children: Not on file   • Years of education: Not on file   • Highest education level: Not on file   Occupational History   • Not on file      Social Needs   • Financial resource strain: Not on file   • Food insecurity:     Worry: Not on file     Inability: Not on file   • Transportation needs:     Medical: Not on file     Non-medical: Not on file   Tobacco Use   • Smoking status: Never Smoker   • Smokeless tobacco: Never Used   Substance and Sexual Activity   • Alcohol use: Not Currently   • Drug use: No   • Sexual activity: Yes     Partners: Male     Birth control/protection: Pill   Lifestyle   • Physical activity:     Days per week: Not on file     Minutes per session: Not on file   • Stress: Not on file   Relationships   • Social connections:     Talks on phone: Not on file     Gets together: Not on file     Attends Islam service: Not on file     Active member of club or organization: Not on file     Attends meetings of clubs or organizations: Not on file     Relationship status: Not on file   • Intimate partner violence:     Fear of current or ex partner: Not on file     Emotionally abused: Not on file     Physically abused: Not on file     Forced sexual activity: Not on file   Other Topics Concern   • Not on file   Social History Narrative   • Not on file       Review of Systems   Constitutional: Negative for chills and fever.   Skin: Negative for itching and rash.      Objective:     A focused cutaneous exam was completed including: hair, ears, face, eyelids, conjunctiva, lips, neck, left and right upper extremities with the following pertinent findings listed below. Remaining above-listed examined areas within normal limits / negative for rashes or lesions.    There were no vitals taken for this visit.    Physical Exam   Constitutional: She is oriented to person, place, and time and well-developed, well-nourished, and in no distress.   HENT:   Head: Normocephalic and atraumatic.       Nose:       Eyes: Conjunctivae and lids are normal.   Neck: Normal range of motion.   Pulmonary/Chest: Effort normal.   Neurological: She is alert and oriented  to person, place, and time.   Skin: Skin is warm and dry.   Psychiatric: Mood and affect normal.       DATA: none applicable to review    Assessment and Plan:     1. Acne vulgaris, inflammatory , mild/moderate  - educated patient about diagnosis, management options, and expectations of treatment  - start clindamycin/Benzoyl peroxide 1.2-5%% gel daily to as a thin film to cover areas on the face/neck. Side effect of irritation, bleaching of clothes/towels discussed  - Instructed to start retin-a 0.05% cream qhs. To use pea-sized amount on entire face; start 2-3 nights/week and titrate up as tolerated. S/e irritation discussed.  - if skin becomes dry, OTC moisturizers recommended  - discussed that above prescribed medications cannot be used during pregnancy     2. Neoplasm of uncertain behavior of skin  Procedure Note   Procedure: Biopsy by shave technique  Location: nose  Size: as noted in exam  Preoperative diagnosis:fibrous papule, r/o atypia  Risks, benefits and alternatives of procedure discussed, verbal consent obtained for photo (see chart) and written informed consent obtained for procedure. Time out completed. Area of biopsy prepped with alcohol. Anesthesia with 1% lidocaine with epinephrine administered with 30 gauge needle. Shave biopsy of the site performed. Hemostasis achieved with pressure and aluminum chloride. Vaseline applied to wound with bandage. Patient tolerated procedure well and there were no complications. The specimen was sent to the pathology lab by the staff. Wound care was discussed.    Followup: Return in about 6 months (around 5/7/2020).    Juany Neely M.D.

## 2019-11-13 ENCOUNTER — TELEPHONE (OUTPATIENT)
Dept: DERMATOLOGY | Facility: IMAGING CENTER | Age: 24
End: 2019-11-13

## 2019-11-13 NOTE — TELEPHONE ENCOUNTER
Phone Number Called: [phone] (home)       Call outcome: left message for patient to call back regarding message below    Message: No pt identifier, call clinic back for results.     *Results are benign. D- Path not scanned in.

## 2019-11-13 NOTE — TELEPHONE ENCOUNTER
Phone Number Called: [phone] (home)       Call outcome: left message for patient to call back regarding message below    Message: No pt identifier, call clinic back for results.     *Results are benign. D- Path not scanned in.

## 2020-02-27 DIAGNOSIS — F41.9 ANXIETY: ICD-10-CM

## 2020-02-27 RX ORDER — BUSPIRONE HYDROCHLORIDE 15 MG/1
TABLET ORAL
Qty: 270 TAB | Refills: 2 | Status: SHIPPED | OUTPATIENT
Start: 2020-02-27 | End: 2022-04-29

## 2021-04-14 ENCOUNTER — IMMUNIZATION (OUTPATIENT)
Dept: FAMILY PLANNING/WOMEN'S HEALTH CLINIC | Facility: IMMUNIZATION CENTER | Age: 26
End: 2021-04-14
Payer: COMMERCIAL

## 2021-04-14 DIAGNOSIS — Z23 ENCOUNTER FOR VACCINATION: Primary | ICD-10-CM

## 2021-04-14 PROCEDURE — 0001A PFIZER SARS-COV-2 VACCINE: CPT | Performed by: INTERNAL MEDICINE

## 2021-04-14 PROCEDURE — 91300 PFIZER SARS-COV-2 VACCINE: CPT | Performed by: INTERNAL MEDICINE

## 2021-05-07 ENCOUNTER — IMMUNIZATION (OUTPATIENT)
Dept: FAMILY PLANNING/WOMEN'S HEALTH CLINIC | Facility: IMMUNIZATION CENTER | Age: 26
End: 2021-05-07
Payer: COMMERCIAL

## 2021-05-07 DIAGNOSIS — Z23 ENCOUNTER FOR VACCINATION: Primary | ICD-10-CM

## 2021-05-07 PROCEDURE — 0002A PFIZER SARS-COV-2 VACCINE: CPT

## 2021-05-07 PROCEDURE — 91300 PFIZER SARS-COV-2 VACCINE: CPT

## 2021-12-15 ENCOUNTER — OFFICE VISIT (OUTPATIENT)
Dept: MEDICAL GROUP | Facility: MEDICAL CENTER | Age: 26
End: 2021-12-15
Payer: COMMERCIAL

## 2021-12-15 VITALS
TEMPERATURE: 97.1 F | BODY MASS INDEX: 28.78 KG/M2 | HEART RATE: 83 BPM | OXYGEN SATURATION: 96 % | WEIGHT: 156.4 LBS | SYSTOLIC BLOOD PRESSURE: 106 MMHG | HEIGHT: 62 IN | DIASTOLIC BLOOD PRESSURE: 62 MMHG

## 2021-12-15 DIAGNOSIS — Z13.6 SCREENING FOR CARDIOVASCULAR CONDITION: ICD-10-CM

## 2021-12-15 DIAGNOSIS — Z00.00 WELLNESS EXAMINATION: ICD-10-CM

## 2021-12-15 DIAGNOSIS — L30.9 HAND ECZEMA: ICD-10-CM

## 2021-12-15 DIAGNOSIS — F41.9 ANXIETY: ICD-10-CM

## 2021-12-15 PROCEDURE — 99214 OFFICE O/P EST MOD 30 MIN: CPT | Performed by: PHYSICIAN ASSISTANT

## 2021-12-15 RX ORDER — BUSPIRONE HYDROCHLORIDE 7.5 MG/1
7.5 TABLET ORAL 3 TIMES DAILY
Qty: 90 TABLET | Refills: 1 | Status: SHIPPED | OUTPATIENT
Start: 2021-12-15 | End: 2022-01-06

## 2021-12-15 RX ORDER — ALPRAZOLAM 0.25 MG/1
0.25 TABLET ORAL NIGHTLY PRN
Qty: 10 TABLET | Refills: 0 | Status: SHIPPED | OUTPATIENT
Start: 2021-12-15 | End: 2022-04-29 | Stop reason: SDUPTHER

## 2021-12-15 RX ORDER — TRIAMCINOLONE ACETONIDE 1 MG/G
1 OINTMENT TOPICAL 2 TIMES DAILY
Qty: 30 G | Refills: 2 | Status: SHIPPED | OUTPATIENT
Start: 2021-12-15 | End: 2022-01-14

## 2021-12-15 NOTE — ASSESSMENT & PLAN NOTE
Mostly triggered by travel and big changes. Just bought a house 2 weeks ago. Last office visit about 2 years ago. On the busprione for about 6 months then able to stop. Would like to restart.  Would like to see a new psychologist. Also having panic attacks and would like refill of xanax. Only for PRN use.  verified.    NEO-7 Questionnaire    Feeling nervous, anxious, or on edge: Nearly every day  Not being able to sop or control worrying: Nearly every day  Worrying too much about different things: More than half the days  Trouble relaxing: More than half the days  Being so restless that it's hard to sit still: More than half the days  Becoming easily annoyed or irritable: More than half the days  Feeling afraid as if something awful might happen: Nearly every day  Total: 17    Interpretation of NEO 7 Total Score   Score Severity :  0-4 No Anxiety   5-9 Mild Anxiety  10-14 Moderate Anxiety  15-21 Severe Anxiety  Depression Screening    Little interest or pleasure in doing things?  1 - several days   Feeling down, depressed , or hopeless? 1 - several days   Trouble falling or staying asleep, or sleeping too much?  1 - several days   Feeling tired or having little energy?  1 - several days   Poor appetite or overeating?  1 - several days   Feeling bad about yourself - or that you are a failure or have let yourself or your family down? 0 - not at all   Trouble concentrating on things, such as reading the newspaper or watching television? 0 - not at all   Moving or speaking so slowly that other people could have noticed.  Or the opposite - being so fidgety or restless that you have been moving around a lot more than usual?  0 - not at all   Thoughts that you would be better off dead, or of hurting yourself?  0 - not at all   Patient Health Questionnaire Score: 5       If depressive symptoms identified deferred to follow up visit unless specifically addressed in assesment and plan.    Interpretation of PHQ-9 Total  Score   Score Severity   1-4 No Depression   5-9 Mild Depression   10-14 Moderate Depression   15-19 Moderately Severe Depression   20-27 Severe Depression

## 2021-12-16 PROBLEM — L30.9 HAND ECZEMA: Status: ACTIVE | Noted: 2021-12-16

## 2021-12-16 ASSESSMENT — ANXIETY QUESTIONNAIRES
3. WORRYING TOO MUCH ABOUT DIFFERENT THINGS: MORE THAN HALF THE DAYS
7. FEELING AFRAID AS IF SOMETHING AWFUL MIGHT HAPPEN: NEARLY EVERY DAY
6. BECOMING EASILY ANNOYED OR IRRITABLE: MORE THAN HALF THE DAYS
5. BEING SO RESTLESS THAT IT IS HARD TO SIT STILL: MORE THAN HALF THE DAYS
4. TROUBLE RELAXING: MORE THAN HALF THE DAYS
2. NOT BEING ABLE TO STOP OR CONTROL WORRYING: NEARLY EVERY DAY
GAD7 TOTAL SCORE: 17
IF YOU CHECKED OFF ANY PROBLEMS ON THIS QUESTIONNAIRE, HOW DIFFICULT HAVE THESE PROBLEMS MADE IT FOR YOU TO DO YOUR WORK, TAKE CARE OF THINGS AT HOME, OR GET ALONG WITH OTHER PEOPLE: SOMEWHAT DIFFICULT
1. FEELING NERVOUS, ANXIOUS, OR ON EDGE: NEARLY EVERY DAY

## 2021-12-16 ASSESSMENT — PATIENT HEALTH QUESTIONNAIRE - PHQ9
5. POOR APPETITE OR OVEREATING: 1 - SEVERAL DAYS
CLINICAL INTERPRETATION OF PHQ2 SCORE: 2
SUM OF ALL RESPONSES TO PHQ QUESTIONS 1-9: 5

## 2021-12-16 NOTE — PROGRESS NOTES
Subjective:   Charlotte Duarte is a 26 y.o. female here today for discussion about anxiety, will reschedule well woman/annual exam    Anxiety  Mostly triggered by travel and big changes. Just bought a house 2 weeks ago. Last office visit about 2 years ago. On the busprione for about 6 months then able to stop. Would like to restart.  Would like to see a new psychologist. Also having panic attacks and would like refill of xanax. Only for PRN use.  verified.    NEO-7 Questionnaire    Feeling nervous, anxious, or on edge: Nearly every day  Not being able to sop or control worrying: Nearly every day  Worrying too much about different things: More than half the days  Trouble relaxing: More than half the days  Being so restless that it's hard to sit still: More than half the days  Becoming easily annoyed or irritable: More than half the days  Feeling afraid as if something awful might happen: Nearly every day  Total: 17    Interpretation of NEO 7 Total Score   Score Severity :  0-4 No Anxiety   5-9 Mild Anxiety  10-14 Moderate Anxiety  15-21 Severe Anxiety  Depression Screening    Little interest or pleasure in doing things?  1 - several days   Feeling down, depressed , or hopeless? 1 - several days   Trouble falling or staying asleep, or sleeping too much?  1 - several days   Feeling tired or having little energy?  1 - several days   Poor appetite or overeating?  1 - several days   Feeling bad about yourself - or that you are a failure or have let yourself or your family down? 0 - not at all   Trouble concentrating on things, such as reading the newspaper or watching television? 0 - not at all   Moving or speaking so slowly that other people could have noticed.  Or the opposite - being so fidgety or restless that you have been moving around a lot more than usual?  0 - not at all   Thoughts that you would be better off dead, or of hurting yourself?  0 - not at all   Patient Health Questionnaire Score: 5       If  "depressive symptoms identified deferred to follow up visit unless specifically addressed in assesment and plan.    Interpretation of PHQ-9 Total Score   Score Severity   1-4 No Depression   5-9 Mild Depression   10-14 Moderate Depression   15-19 Moderately Severe Depression   20-27 Severe Depression        Hand eczema  Chronic, getting worse, red and dry, itching and burning, requesting prescription steroid cream       Current medicines (including changes today)  Current Outpatient Medications   Medication Sig Dispense Refill   • busPIRone (BUSPAR) 7.5 MG tablet Take 1 Tablet by mouth 3 times a day for 30 days. 90 Tablet 1   • triamcinolone acetonide (KENALOG) 0.1 % Ointment Apply 1 Application topically 2 times a day for 30 days. 30 g 2   • ALPRAZolam (XANAX) 0.25 MG Tab Take 1 Tablet by mouth at bedtime as needed for Sleep or Anxiety for up to 30 days. 10 Tablet 0   • busPIRone (BUSPAR) 15 MG tablet TAKE 1 TABLET BY MOUTH THREE TIMES A  Tab 2     No current facility-administered medications for this visit.     She  has no past medical history on file.    ROS   No fever/chills. No weight change. No headache/dizziness. No focal weakness. No sore throat, nasal congestion, ear pain. No chest pain, no shortness of breath, difficulty breathing. No n/v/d/c or abdominal pain. No urinary complaint.  No joint pain or swelling.       Objective:     /62 (BP Location: Left arm, Patient Position: Sitting, BP Cuff Size: Adult)   Pulse 83   Temp 36.2 °C (97.1 °F) (Temporal)   Ht 1.575 m (5' 2\")   Wt 70.9 kg (156 lb 6.4 oz)   SpO2 96%  Body mass index is 28.61 kg/m².   Physical Exam:  Constitutional: WDWN, NAD  Skin: Warm, dry, good turgor, no rashes in visible areas.  Psych: Alert and oriented x3, normal affect and mood.    Assessment and Plan:   The following treatment plan was discussed    1. Anxiety    - Referral to Psychology  - busPIRone (BUSPAR) 7.5 MG tablet; Take 1 Tablet by mouth 3 times a day for 30 " days.  Dispense: 90 Tablet; Refill: 1  - ALPRAZolam (XANAX) 0.25 MG Tab; Take 1 Tablet by mouth at bedtime as needed for Sleep or Anxiety for up to 30 days.  Dispense: 10 Tablet; Refill: 0    2. Wellness examination    - CBC WITH DIFFERENTIAL; Future  - Comp Metabolic Panel; Future  - TSH WITH REFLEX TO FT4; Future    3. Screening for cardiovascular condition    - Lipid Profile; Future    4. Hand eczema    - triamcinolone acetonide (KENALOG) 0.1 % Ointment; Apply 1 Application topically 2 times a day for 30 days.  Dispense: 30 g; Refill: 2      Followup: wellness/well woman and f/u medication

## 2022-01-06 ENCOUNTER — OFFICE VISIT (OUTPATIENT)
Dept: MEDICAL GROUP | Facility: MEDICAL CENTER | Age: 27
End: 2022-01-06
Payer: COMMERCIAL

## 2022-01-06 ENCOUNTER — HOSPITAL ENCOUNTER (OUTPATIENT)
Dept: LAB | Facility: MEDICAL CENTER | Age: 27
End: 2022-01-06
Attending: PHYSICIAN ASSISTANT
Payer: COMMERCIAL

## 2022-01-06 ENCOUNTER — HOSPITAL ENCOUNTER (OUTPATIENT)
Facility: MEDICAL CENTER | Age: 27
End: 2022-01-06
Attending: PHYSICIAN ASSISTANT
Payer: COMMERCIAL

## 2022-01-06 VITALS
HEART RATE: 79 BPM | DIASTOLIC BLOOD PRESSURE: 60 MMHG | SYSTOLIC BLOOD PRESSURE: 100 MMHG | BODY MASS INDEX: 28.71 KG/M2 | WEIGHT: 156 LBS | TEMPERATURE: 98.3 F | OXYGEN SATURATION: 100 % | HEIGHT: 62 IN

## 2022-01-06 DIAGNOSIS — Z13.6 SCREENING FOR CARDIOVASCULAR CONDITION: ICD-10-CM

## 2022-01-06 DIAGNOSIS — Z23 NEED FOR HPV VACCINATION: ICD-10-CM

## 2022-01-06 DIAGNOSIS — Z00.00 WELLNESS EXAMINATION: ICD-10-CM

## 2022-01-06 DIAGNOSIS — F41.9 ANXIETY: ICD-10-CM

## 2022-01-06 DIAGNOSIS — Z12.4 SCREENING FOR CERVICAL CANCER: ICD-10-CM

## 2022-01-06 LAB
ALBUMIN SERPL BCP-MCNC: 4.7 G/DL (ref 3.2–4.9)
ALBUMIN/GLOB SERPL: 1.6 G/DL
ALP SERPL-CCNC: 45 U/L (ref 30–99)
ALT SERPL-CCNC: 23 U/L (ref 2–50)
ANION GAP SERPL CALC-SCNC: 9 MMOL/L (ref 7–16)
AST SERPL-CCNC: 13 U/L (ref 12–45)
BASOPHILS # BLD AUTO: 1.3 % (ref 0–1.8)
BASOPHILS # BLD: 0.06 K/UL (ref 0–0.12)
BILIRUB SERPL-MCNC: 0.4 MG/DL (ref 0.1–1.5)
BUN SERPL-MCNC: 19 MG/DL (ref 8–22)
CALCIUM SERPL-MCNC: 9.1 MG/DL (ref 8.5–10.5)
CHLORIDE SERPL-SCNC: 104 MMOL/L (ref 96–112)
CHOLEST SERPL-MCNC: 164 MG/DL (ref 100–199)
CO2 SERPL-SCNC: 25 MMOL/L (ref 20–33)
CREAT SERPL-MCNC: 0.74 MG/DL (ref 0.5–1.4)
EOSINOPHIL # BLD AUTO: 0.08 K/UL (ref 0–0.51)
EOSINOPHIL NFR BLD: 1.7 % (ref 0–6.9)
ERYTHROCYTE [DISTWIDTH] IN BLOOD BY AUTOMATED COUNT: 44.9 FL (ref 35.9–50)
FASTING STATUS PATIENT QL REPORTED: NORMAL
GLOBULIN SER CALC-MCNC: 3 G/DL (ref 1.9–3.5)
GLUCOSE SERPL-MCNC: 87 MG/DL (ref 65–99)
HCT VFR BLD AUTO: 41.5 % (ref 37–47)
HDLC SERPL-MCNC: 62 MG/DL
HGB BLD-MCNC: 12.9 G/DL (ref 12–16)
IMM GRANULOCYTES # BLD AUTO: 0 K/UL (ref 0–0.11)
IMM GRANULOCYTES NFR BLD AUTO: 0 % (ref 0–0.9)
LDLC SERPL CALC-MCNC: 92 MG/DL
LYMPHOCYTES # BLD AUTO: 1.57 K/UL (ref 1–4.8)
LYMPHOCYTES NFR BLD: 33.1 % (ref 22–41)
MCH RBC QN AUTO: 27.2 PG (ref 27–33)
MCHC RBC AUTO-ENTMCNC: 31.1 G/DL (ref 33.6–35)
MCV RBC AUTO: 87.4 FL (ref 81.4–97.8)
MONOCYTES # BLD AUTO: 0.31 K/UL (ref 0–0.85)
MONOCYTES NFR BLD AUTO: 6.5 % (ref 0–13.4)
NEUTROPHILS # BLD AUTO: 2.73 K/UL (ref 2–7.15)
NEUTROPHILS NFR BLD: 57.4 % (ref 44–72)
NRBC # BLD AUTO: 0 K/UL
NRBC BLD-RTO: 0 /100 WBC
PLATELET # BLD AUTO: 287 K/UL (ref 164–446)
PMV BLD AUTO: 10.9 FL (ref 9–12.9)
POTASSIUM SERPL-SCNC: 3.9 MMOL/L (ref 3.6–5.5)
PROT SERPL-MCNC: 7.7 G/DL (ref 6–8.2)
RBC # BLD AUTO: 4.75 M/UL (ref 4.2–5.4)
SODIUM SERPL-SCNC: 138 MMOL/L (ref 135–145)
TRIGL SERPL-MCNC: 49 MG/DL (ref 0–149)
TSH SERPL DL<=0.005 MIU/L-ACNC: 2.32 UIU/ML (ref 0.38–5.33)
WBC # BLD AUTO: 4.8 K/UL (ref 4.8–10.8)

## 2022-01-06 PROCEDURE — 84443 ASSAY THYROID STIM HORMONE: CPT

## 2022-01-06 PROCEDURE — 80053 COMPREHEN METABOLIC PANEL: CPT

## 2022-01-06 PROCEDURE — 90651 9VHPV VACCINE 2/3 DOSE IM: CPT | Performed by: PHYSICIAN ASSISTANT

## 2022-01-06 PROCEDURE — 90471 IMMUNIZATION ADMIN: CPT | Performed by: PHYSICIAN ASSISTANT

## 2022-01-06 PROCEDURE — 80061 LIPID PANEL: CPT

## 2022-01-06 PROCEDURE — 99395 PREV VISIT EST AGE 18-39: CPT | Mod: 25 | Performed by: PHYSICIAN ASSISTANT

## 2022-01-06 PROCEDURE — 36415 COLL VENOUS BLD VENIPUNCTURE: CPT

## 2022-01-06 PROCEDURE — 85025 COMPLETE CBC W/AUTO DIFF WBC: CPT

## 2022-01-06 PROCEDURE — 88175 CYTOPATH C/V AUTO FLUID REDO: CPT

## 2022-01-06 RX ORDER — BUSPIRONE HYDROCHLORIDE 7.5 MG/1
TABLET ORAL
Qty: 90 TABLET | Refills: 11 | Status: SHIPPED | OUTPATIENT
Start: 2022-01-06 | End: 2022-07-27

## 2022-01-06 ASSESSMENT — PATIENT HEALTH QUESTIONNAIRE - PHQ9: CLINICAL INTERPRETATION OF PHQ2 SCORE: 0

## 2022-01-06 NOTE — PROGRESS NOTES
"SUBJECTIVE: 26 y.o. female for annual routine pap and checkup.  Chief Complaint   Patient presents with   • Gynecologic Exam         Last Pap: 2019  Contraception: IUD  H/O Abnormal Pap no  H/O STI no  Current partner: monogamous, fiance, no concerns     LMP: No LMP recorded. Patient has had an implant.    Allergies: Cyclinex [tetracycline]     ROS:    Not getting periods but does sometimes get cramps or pains near ovaries  No pelvic pain, or dyspareunia. No vaginal discharge   No breast tenderness, mass, nipple discharge, changes in size or contour, or abnormal cyclic discomfort.  No urinary tract symptoms, no incontinence.   No abdominal pain, change in bowel habits, black or bloody stools.    No unusual headaches, no visual changes.   No prolonged cough. No dyspnea or chest pain on exertion.    No skin lesions, concerns.       Current Outpatient Medications   Medication Sig Dispense Refill   • busPIRone (BUSPAR) 7.5 MG tablet TAKE 1 TABLET BY MOUTH 3 TIMES A DAY 90 Tablet 11   • triamcinolone acetonide (KENALOG) 0.1 % Ointment Apply 1 Application topically 2 times a day for 30 days. 30 g 2   • ALPRAZolam (XANAX) 0.25 MG Tab Take 1 Tablet by mouth at bedtime as needed for Sleep or Anxiety for up to 30 days. 10 Tablet 0   • busPIRone (BUSPAR) 15 MG tablet TAKE 1 TABLET BY MOUTH THREE TIMES A  Tab 2     No current facility-administered medications for this visit.     She  has no past medical history on file.  She  has no past surgical history on file.     Family History:   Family History   Problem Relation Age of Onset   • No Known Problems Mother    • Hypertension Father    • Arthritis Sister        OBJECTIVE:   /60 (BP Location: Right arm, Patient Position: Sitting, BP Cuff Size: Adult long)   Pulse 79   Temp 36.8 °C (98.3 °F) (Temporal)   Ht 1.575 m (5' 2\")   Wt 70.8 kg (156 lb)   SpO2 100%   BMI 28.53 kg/m²   Body mass index is 28.53 kg/m².       Breast Exam: Performed with instruction " during examination. No axillary lymphadenopathy, no skin changes, no dominant masses. No nipple retraction  Pelvic Exam - Sure Path Pap obtained and specimen sent to lab. Normal external genitalia with no lesions. Normal vaginal mucosa with normal rugation and no discharge. Cervix with no visible lesions, 2 IUD strings visible. No cervical motion tenderness. Uterus is normal sized with no masses. No adnexal tenderness or enlargement appreciated.    <ASSESSMENT>  1. Wellness examination     2. Screening for cervical cancer  THINPREP PAP,REFLEX HPV ON ASC-US ONLY   3. Need for HPV vaccination  Gardasil 9       Yearly and as needed

## 2022-01-07 ENCOUNTER — PHARMACY VISIT (OUTPATIENT)
Dept: PHARMACY | Facility: MEDICAL CENTER | Age: 27
End: 2022-01-07
Payer: MEDICARE

## 2022-01-07 ENCOUNTER — APPOINTMENT (OUTPATIENT)
Dept: PHARMACY | Facility: MEDICAL CENTER | Age: 27
End: 2022-01-07
Payer: COMMERCIAL

## 2022-01-07 PROCEDURE — RXMED WILLOW AMBULATORY MEDICATION CHARGE: Performed by: INTERNAL MEDICINE

## 2022-01-07 RX ORDER — RNA INGREDIENT BNT-162B2 0.23 G/1.8ML
0.3 INJECTION, SUSPENSION INTRAMUSCULAR
Qty: 0.3 ML | Refills: 0 | Status: SHIPPED | OUTPATIENT
Start: 2022-01-07 | End: 2022-04-29

## 2022-01-10 DIAGNOSIS — Z12.4 SCREENING FOR CERVICAL CANCER: ICD-10-CM

## 2022-01-10 LAB
AMBIGUOUS DTTM AMBI4: NORMAL
CYTOLOGY REG CYTOL: NORMAL

## 2022-04-29 ENCOUNTER — OFFICE VISIT (OUTPATIENT)
Dept: MEDICAL GROUP | Facility: MEDICAL CENTER | Age: 27
End: 2022-04-29
Payer: COMMERCIAL

## 2022-04-29 VITALS
SYSTOLIC BLOOD PRESSURE: 90 MMHG | HEIGHT: 62 IN | DIASTOLIC BLOOD PRESSURE: 50 MMHG | RESPIRATION RATE: 16 BRPM | HEART RATE: 87 BPM | BODY MASS INDEX: 27.97 KG/M2 | WEIGHT: 152 LBS | OXYGEN SATURATION: 100 % | TEMPERATURE: 98.1 F

## 2022-04-29 DIAGNOSIS — F41.9 ANXIETY: ICD-10-CM

## 2022-04-29 PROCEDURE — 99214 OFFICE O/P EST MOD 30 MIN: CPT | Performed by: PHYSICIAN ASSISTANT

## 2022-04-29 RX ORDER — ALPRAZOLAM 0.25 MG/1
0.25 TABLET ORAL NIGHTLY PRN
Qty: 10 TABLET | Refills: 0 | Status: SHIPPED | OUTPATIENT
Start: 2022-04-29 | End: 2022-05-29

## 2022-04-29 RX ORDER — ESCITALOPRAM OXALATE 10 MG/1
10 TABLET ORAL DAILY
Qty: 30 TABLET | Refills: 2 | Status: SHIPPED | OUTPATIENT
Start: 2022-04-29 | End: 2022-05-29

## 2022-04-29 ASSESSMENT — FIBROSIS 4 INDEX: FIB4 SCORE: 0.26

## 2022-04-29 NOTE — ASSESSMENT & PLAN NOTE
Taking buspirone and seeing therapist. Doesn't feel like it is really helping. Still having a lot of anxiety and then getting depressed because of the anxiety. Willing to try SSRI.

## 2022-04-29 NOTE — PROGRESS NOTES
"Subjective:   Charlotte Duarte is a 27 y.o. female here today for anxiety    Anxiety  Taking buspirone and seeing therapist. Doesn't feel like it is really helping. Still having a lot of anxiety and then getting depressed because of the anxiety. Willing to try SSRI.       Current medicines (including changes today)  Current Outpatient Medications   Medication Sig Dispense Refill   • escitalopram (LEXAPRO) 10 MG Tab Take 1 Tablet by mouth every day for 30 days. 30 Tablet 2   • ALPRAZolam (XANAX) 0.25 MG Tab Take 1 Tablet by mouth at bedtime as needed for Sleep or Anxiety for up to 30 days. 10 Tablet 0   • busPIRone (BUSPAR) 7.5 MG tablet TAKE 1 TABLET BY MOUTH 3 TIMES A DAY 90 Tablet 11     No current facility-administered medications for this visit.     She  has no past medical history on file.    ROS   No fever/chills. No weight change. No headache/dizziness. No focal weakness. No sore throat, nasal congestion, ear pain. No chest pain, no shortness of breath, difficulty breathing. No n/v/d/c or abdominal pain. No urinary complaint. No rash or skin lesion. No joint pain or swelling.       Objective:     BP (!) 90/50 (BP Location: Left arm, Patient Position: Sitting, BP Cuff Size: Adult)   Pulse 87   Temp 36.7 °C (98.1 °F) (Temporal)   Resp 16   Ht 1.575 m (5' 2\")   Wt 68.9 kg (152 lb)   SpO2 100%  Body mass index is 27.8 kg/m².   Physical Exam:  Constitutional: WDWN, NAD  Skin: Warm, dry, good turgor, no rashes in visible areas.  Psych: Alert and oriented x3, normal affect and mood.    Assessment and Plan:   The following treatment plan was discussed    1. Anxiety    - escitalopram (LEXAPRO) 10 MG Tab; Take 1 Tablet by mouth every day for 30 days.  Dispense: 30 Tablet; Refill: 2  - ALPRAZolam (XANAX) 0.25 MG Tab; Take 1 Tablet by mouth at bedtime as needed for Sleep or Anxiety for up to 30 days.  Dispense: 10 Tablet; Refill: 0      Followup:4 weeks         "

## 2022-07-27 ENCOUNTER — OFFICE VISIT (OUTPATIENT)
Dept: MEDICAL GROUP | Facility: LAB | Age: 27
End: 2022-07-27
Payer: COMMERCIAL

## 2022-07-27 VITALS
BODY MASS INDEX: 28.52 KG/M2 | RESPIRATION RATE: 16 BRPM | WEIGHT: 155 LBS | HEART RATE: 70 BPM | TEMPERATURE: 97.1 F | SYSTOLIC BLOOD PRESSURE: 100 MMHG | DIASTOLIC BLOOD PRESSURE: 58 MMHG | OXYGEN SATURATION: 100 % | HEIGHT: 62 IN

## 2022-07-27 DIAGNOSIS — F41.9 ANXIETY: ICD-10-CM

## 2022-07-27 DIAGNOSIS — Z30.09 FAMILY PLANNING EDUCATION, GUIDANCE, AND COUNSELING: ICD-10-CM

## 2022-07-27 DIAGNOSIS — Z76.89 ENCOUNTER TO ESTABLISH CARE: ICD-10-CM

## 2022-07-27 PROBLEM — L70.9 ACNE: Status: RESOLVED | Noted: 2018-08-03 | Resolved: 2022-07-27

## 2022-07-27 PROBLEM — R63.5 WEIGHT GAIN: Status: RESOLVED | Noted: 2018-07-09 | Resolved: 2022-07-27

## 2022-07-27 PROCEDURE — 99203 OFFICE O/P NEW LOW 30 MIN: CPT | Performed by: PHYSICIAN ASSISTANT

## 2022-07-27 RX ORDER — ESCITALOPRAM OXALATE 10 MG/1
10 TABLET ORAL DAILY
Qty: 90 TABLET | Refills: 0 | Status: SHIPPED | OUTPATIENT
Start: 2022-07-27 | End: 2022-11-22 | Stop reason: SDUPTHER

## 2022-07-27 ASSESSMENT — FIBROSIS 4 INDEX: FIB4 SCORE: 0.26

## 2022-07-27 NOTE — PROGRESS NOTES
Subjective:     CC:  There were no encounter diagnoses.    HISTORY OF THE PRESENT ILLNESS: Patient is a 27 y.o. female. This pleasant patient is here today to establish care and discuss medication. His/her prior PCP was Carmella Duke.    Questions about Lexapro  This medication has been working well for her.  She feels like her anxiety symptoms are well controlled with the current dosage.  She was previously prescribed BuSpar but did not tolerate it very well.  Patient has questions about this medication as she and her  are planning to start a family in the near future.  Patient currently has IUD in place and is interested in having it removed.    Check [place] database for tdap      Health Maintenance     - Dyslipidemia (30-45): Up-to-date  - Diabetes (HTN, HLD, BMI >25): Up-to-date  - Depression screening (PHQ-2 and/or PHQ-9): Negative  - Dental: UTD  - Eye: UTD  Diet: pretty good  Exercise: sedentary  Substance Use: denies  Tobacco Use/counseling: denies  Safe in relationship: yes  Seat belts, bike helmet, gun safety discussed.  Sun protection used.       Cancer screening  - Cervical CA (21-65): Up-to-date  -  HX Abnormal pap/HPV: none     Infectious disease screening/Immunizations  --Immunizations: Patient states she is up-to-date on vaccines, we will check vaccine database      Current Outpatient Medications Ordered in Epic   Medication Sig Dispense Refill   • busPIRone (BUSPAR) 7.5 MG tablet TAKE 1 TABLET BY MOUTH 3 TIMES A DAY 90 Tablet 11     No current [facility]-ordered facility-administered medications on file.       Health Maintenance: Completed    ROS:   Gen: no fevers/chills, no changes in weight  Eyes: no changes in vision  ENT: no sore throat, no hearing loss, no bloody nose  Pulm: no sob, no cough  CV: no chest pain, no palpitations  GI: no nausea/vomiting, no diarrhea  : no dysuria  MSk: no myalgias  Skin: no rash  Neuro: no headaches, no numbness/tingling  Heme/Lymph: no easy bruising     "  Objective:       Exam: /58   Pulse 70   Temp 36.2 °C (97.1 °F)   Resp 16   Ht 1.575 m (5' 2\")   Wt 70.3 kg (155 lb)   SpO2 100%  Body mass index is 28.35 kg/m².    General: Normal appearing. No distress.  HEENT: Normocephalic. Eyes conjunctiva clear lids without ptosis, pupils equal and reactive to light accommodation, ears normal shape and contour, canals are clear bilaterally, tympanic membranes are benign, nasal mucosa benign, oropharynx is without erythema, edema or exudates.   Neck: Supple without JVD or bruit. Thyroid is not enlarged.  Pulmonary: Clear to ausculation.  Normal effort. No rales, ronchi, or wheezing.  Cardiovascular: Regular rate and rhythm without murmur. Carotid and radial pulses are intact and equal bilaterally.  Abdomen: Soft, nontender, nondistended. Normal bowel sounds. Liver and spleen are not palpable  Neurologic: Grossly nonfocal  Lymph: No cervical or supraclavicular lymph nodes are palpable  Skin: Warm and dry.  No obvious lesions.  Musculoskeletal: Normal gait. No extremity cyanosis, clubbing, or edema.  Psych: Normal mood and affect. Alert and oriented x3. Judgment and insight is normal.      Assessment & Plan:   27 y.o. female with the following -    1. Encounter to establish care  Labs per orders  Vaccinations per orders  Screenings per orders    2. Anxiety  Chronic condition, well-controlled  Continue Lexapro 10 mg daily for now    3. Family planning education, guidance, and counseling  Plan to reevaluate in near future  Also counseled patient on starting prenatal vitamin particularly folic acid supplementation      I spent a total of 20 minutes with record review, exam, communication with the patient, communication with other providers, and documentation of this encounter.    No follow-ups on file.    Please note that this dictation was created using voice recognition software. I have made every reasonable attempt to correct obvious errors, but I expect that there " are errors of grammar and possibly content that I did not discover before finalizing the note.

## 2022-08-05 ENCOUNTER — OFFICE VISIT (OUTPATIENT)
Dept: MEDICAL GROUP | Facility: LAB | Age: 27
End: 2022-08-05
Payer: COMMERCIAL

## 2022-08-05 VITALS
RESPIRATION RATE: 16 BRPM | SYSTOLIC BLOOD PRESSURE: 102 MMHG | HEART RATE: 80 BPM | WEIGHT: 158 LBS | TEMPERATURE: 98.4 F | BODY MASS INDEX: 29.08 KG/M2 | OXYGEN SATURATION: 95 % | DIASTOLIC BLOOD PRESSURE: 58 MMHG | HEIGHT: 62 IN

## 2022-08-05 DIAGNOSIS — Z30.09 FAMILY PLANNING EDUCATION, GUIDANCE, AND COUNSELING: ICD-10-CM

## 2022-08-05 PROCEDURE — 58301 REMOVE INTRAUTERINE DEVICE: CPT | Performed by: PHYSICIAN ASSISTANT

## 2022-08-05 ASSESSMENT — FIBROSIS 4 INDEX: FIB4 SCORE: 0.26

## 2022-08-05 NOTE — PROCEDURES
PROCEDURE:   The speculum was placed and the IUD string visualized.  Using ringed  forceps, the IUD string was grasped and the device was removed without  difficulty.  Bleeding was minimal.    Followup: The patient tolerated the procedure well without  complications.  Standard post-procedure care is explained and return  precautions are given.

## 2022-08-05 NOTE — PROGRESS NOTES
"Subjective:     CC: IUD removal     HPI:   Charlotte here today with    Pt here today for IUD removal. She and her  are planning to start a family.    ROS:  Gen: no fevers/chills  Pulm: no sob, no cough  CV: no chest pain, no palpitations  GI: no nausea/vomiting, no diarrhea    Current Outpatient Medications Ordered in Epic   Medication Sig Dispense Refill   • escitalopram (LEXAPRO) 10 MG Tab Take 1 Tablet by mouth every day. 90 Tablet 0     No current Epic-ordered facility-administered medications on file.       Objective:     Exam:  /58   Pulse 80   Temp 36.9 °C (98.4 °F)   Resp 16   Ht 1.575 m (5' 2\")   Wt 71.7 kg (158 lb)   SpO2 95%   BMI 28.90 kg/m²  Body mass index is 28.9 kg/m².    Constitutional: Alert, no distress, well-groomed.  Skin: Warm, dry, good turgor, no rashes in visible areas.  Eye: Equal, round and reactive, conjunctiva clear, lids normal.  ENMT: Lips without lesions, good dentition, moist mucous membranes.  Neck: Trachea midline, no masses, no thyromegaly.  Respiratory: Unlabored respiratory effort, no cough.  MSK: Normal gait, moves all extremities.  Neuro: Grossly non-focal.   Psych: Alert and oriented x3, normal affect and mood  Pelvic exam:  Perineum: No external lesions are noted, color is symmetrical throughout  Vagina: Vaginal vault is well rugated.  Cervix: Parous, nonfriable          Assessment & Plan:     27 y.o. female with the following -     Problem List Items Addressed This Visit     Family planning education, guidance, and counseling      IUD removed today in clinic, see procedure note    I spent a total of 7 minutes with record review, exam, communication with the patient, communication with other providers, and documentation of this encounter.      No follow-ups on file.    Please note that this dictation was created using voice recognition software. I have made every reasonable attempt to correct obvious errors, but there may be errors of grammar and possibly " content that I did not discover before finalizing the note.

## 2022-11-22 RX ORDER — ESCITALOPRAM OXALATE 10 MG/1
10 TABLET ORAL DAILY
Qty: 90 TABLET | Refills: 0 | Status: SHIPPED | OUTPATIENT
Start: 2022-11-22 | End: 2023-03-21

## 2022-11-22 NOTE — TELEPHONE ENCOUNTER
Received request via: Pharmacy  8/5/22  Was the patient seen in the last year in this department? Yes    Does the patient have an active prescription (recently filled or refills available) for medication(s) requested? No    Does the patient have skilled nursing Plus and need 100 day supply (blood pressure, diabetes and cholesterol meds only)? Patient does not have SCP

## 2023-03-21 RX ORDER — ESCITALOPRAM OXALATE 10 MG/1
TABLET ORAL
Qty: 90 TABLET | Refills: 0 | Status: SHIPPED | OUTPATIENT
Start: 2023-03-21 | End: 2023-06-12 | Stop reason: SDUPTHER

## 2023-03-21 NOTE — TELEPHONE ENCOUNTER
Received request via: Pharmacy    Was the patient seen in the last year in this department? Yes  LOV 08/05/2022  Does the patient have an active prescription (recently filled or refills available) for medication(s) requested? No    Does the patient have half-way Plus and need 100 day supply (blood pressure, diabetes and cholesterol meds only)? Patient does not have SCP

## 2023-06-12 RX ORDER — ESCITALOPRAM OXALATE 10 MG/1
10 TABLET ORAL
Qty: 90 TABLET | Refills: 1 | Status: SHIPPED | OUTPATIENT
Start: 2023-06-12 | End: 2023-10-09

## 2023-06-12 NOTE — TELEPHONE ENCOUNTER
Received request via: Pharmacy    Was the patient seen in the last year in this department? Yes  LOV 08/05/2022  Does the patient have an active prescription (recently filled or refills available) for medication(s) requested? No    Does the patient have half-way Plus and need 100 day supply (blood pressure, diabetes and cholesterol meds only)? Medication is not for cholesterol, blood pressure or diabetes and Patient does not have SCP

## 2023-09-08 RX ORDER — ESCITALOPRAM OXALATE 10 MG/1
10 TABLET ORAL
Qty: 90 TABLET | Refills: 0 | OUTPATIENT
Start: 2023-09-08

## 2023-09-08 NOTE — TELEPHONE ENCOUNTER
Received request via: Pharmacy    Was the patient seen in the last year in this department? No 8/5/22    Does the patient have an active prescription (recently filled or refills available) for medication(s) requested? No    Does the patient have [facility] Plus and need 100 day supply (blood pressure, diabetes and cholesterol meds only)? Medication is not for cholesterol, blood pressure or diabetes

## 2023-09-18 ENCOUNTER — APPOINTMENT (OUTPATIENT)
Dept: MEDICAL GROUP | Facility: LAB | Age: 28
End: 2023-09-18
Payer: COMMERCIAL

## 2024-01-16 ENCOUNTER — TELEMEDICINE (OUTPATIENT)
Dept: MEDICAL GROUP | Facility: LAB | Age: 29
End: 2024-01-16
Payer: COMMERCIAL

## 2024-01-16 VITALS — HEIGHT: 62 IN | BODY MASS INDEX: 28.9 KG/M2

## 2024-01-16 DIAGNOSIS — Z13.220 LIPID SCREENING: ICD-10-CM

## 2024-01-16 DIAGNOSIS — F41.9 ANXIETY: ICD-10-CM

## 2024-01-16 DIAGNOSIS — Z13.29 SCREENING FOR THYROID DISORDER: ICD-10-CM

## 2024-01-16 PROCEDURE — 99213 OFFICE O/P EST LOW 20 MIN: CPT | Mod: 95 | Performed by: PHYSICIAN ASSISTANT

## 2024-01-16 RX ORDER — ESCITALOPRAM OXALATE 10 MG/1
10 TABLET ORAL
Qty: 90 TABLET | Refills: 3 | Status: SHIPPED | OUTPATIENT
Start: 2024-01-16

## 2024-01-16 ASSESSMENT — PATIENT HEALTH QUESTIONNAIRE - PHQ9: CLINICAL INTERPRETATION OF PHQ2 SCORE: 0

## 2024-01-16 NOTE — PROGRESS NOTES
"Virtual Visit: Established Patient   This visit was conducted via Zoom using secure and encrypted videoconferencing technology.   The patient was in their home in the [facility].    The patient's identity was confirmed and verbal consent was obtained for this virtual visit.    Subjective:   CC:   Chief Complaint   Patient presents with    Medication Refill     Charlotte Duarte is a 28 y.o. female presenting for evaluation and management of:    Panic attacks, anxiety  Previously prescribed Lexapro 10 mg daily.  Patient reports this medication is been working very well for her.  Denies medication side effects.  She would like to continue current medication at current dose    Patient is due for updated annual labs    ROS   All ROS negative except for pertinent positives listed above.       Current medicines (including changes today)  Current Outpatient Medications   Medication Sig Dispense Refill    escitalopram (LEXAPRO) 10 MG Tab Take 1 Tablet by mouth every day. 90 Tablet 3     No current facility-administered medications for this visit.       Patient Active Problem List    Diagnosis Date Noted    Family planning education, guidance, and counseling 07/27/2022    Hand eczema 12/16/2021    Anxiety 12/15/2021    Panic attacks 07/09/2018        Objective:   Ht 1.575 m (5' 2\")   BMI 28.90 kg/m²     Physical Exam:  Constitutional: Alert, no distress, well-groomed.  Skin: No rashes in visible areas.  Eye: Round. Conjunctiva clear, lids normal. No icterus.   ENMT: Lips pink without lesions, good dentition, moist mucous membranes. Phonation normal.  Neck: No masses, no thyromegaly. Moves freely without pain.  Respiratory: Unlabored respiratory effort, no cough or audible wheeze  Psych: Alert and oriented x3, normal affect and mood.     Assessment and Plan:   The following treatment plan was discussed:     1. Anxiety  - escitalopram (LEXAPRO) 10 MG Tab; Take 1 Tablet by mouth every day.  Dispense: 90 Tablet; Refill: " 3    2. Lipid screening  - CBC WITH DIFFERENTIAL; Future  - Comp Metabolic Panel; Future  - Lipid Profile; Future    3. Screening for thyroid disorder  - TSH WITH REFLEX TO FT4; Future      Follow-up: No follow-ups on file.

## 2025-01-12 DIAGNOSIS — F41.9 ANXIETY: ICD-10-CM

## 2025-01-13 RX ORDER — ESCITALOPRAM OXALATE 10 MG/1
10 TABLET ORAL
Qty: 90 TABLET | Refills: 3 | Status: SHIPPED | OUTPATIENT
Start: 2025-01-13
